# Patient Record
Sex: FEMALE | Race: BLACK OR AFRICAN AMERICAN | Employment: UNEMPLOYED | URBAN - METROPOLITAN AREA
[De-identification: names, ages, dates, MRNs, and addresses within clinical notes are randomized per-mention and may not be internally consistent; named-entity substitution may affect disease eponyms.]

---

## 2020-03-09 ENCOUNTER — HOSPITAL ENCOUNTER (OUTPATIENT)
Age: 25
Discharge: HOME | End: 2020-03-09
Attending: OBSTETRICS & GYNECOLOGY | Admitting: OBSTETRICS & GYNECOLOGY
Payer: SELF-PAY

## 2020-03-09 VITALS
OXYGEN SATURATION: 97 % | RESPIRATION RATE: 16 BRPM | BODY MASS INDEX: 29.02 KG/M2 | SYSTOLIC BLOOD PRESSURE: 118 MMHG | HEIGHT: 64 IN | DIASTOLIC BLOOD PRESSURE: 63 MMHG | TEMPERATURE: 97.9 F | HEART RATE: 90 BPM | WEIGHT: 170 LBS

## 2020-03-09 PROBLEM — O46.93 VAGINAL BLEEDING IN PREGNANCY, THIRD TRIMESTER: Status: ACTIVE | Noted: 2020-03-09

## 2020-03-09 PROCEDURE — 75810000275 HC EMERGENCY DEPT VISIT NO LEVEL OF CARE

## 2020-03-09 PROCEDURE — 99282 EMERGENCY DEPT VISIT SF MDM: CPT

## 2020-03-09 NOTE — DISCHARGE INSTRUCTIONS
Patient Education        Vaginal Bleeding During Pregnancy: Care Instructions  Your Care Instructions    Many women have some vaginal bleeding when they are pregnant. In some cases, the bleeding is not serious. And there aren't any more problems with the pregnancy. But sometimes bleeding is a sign of a more serious problem. This is more common if the bleeding is heavy or painful. Examples of more serious problems include miscarriage, an ectopic pregnancy, or a problem with the placenta. You may have to see your doctor again to be sure everything is okay. You may also need more tests to find the cause of the bleeding. For some women, home treatment is all they need. But it depends on what is causing the bleeding. Be sure to tell your doctor if you have any new symptoms or if your symptoms get worse. The doctor has checked you carefully, but problems can develop later. If you notice any problems or new symptoms, get medical treatment right away. Follow-up care is a key part of your treatment and safety. Be sure to make and go to all appointments, and call your doctor if you are having problems. It's also a good idea to know your test results and keep a list of the medicines you take. How can you care for yourself at home? · If your doctor prescribed medicines, take them exactly as directed. Call your doctor if you think you are having a problem with your medicine. · Do not have sex until the bleeding stops and your doctor says it's okay. · Ask your doctor about other activities you can or can't do. · Get a lot of rest. Being pregnant can make you tired. · Eat a healthy diet. Include a lot of peas, beans, and leafy green vegetables. Talk to a dietitian if you need help planning your diet. · Do not use nonsteroidal anti-inflammatory drugs (NSAIDs), such as ibuprofen (Advil, Motrin), naproxen (Aleve), or aspirin, unless your doctor says it is okay. When should you call for help?   Call 911 anytime you think you may need emergency care. For example, call if:    · You passed out (lost consciousness).     · You have severe vaginal bleeding.    Call your doctor now or seek immediate medical care if:    · You have any vaginal bleeding.     · You have pain in your belly or pelvis.     · You think that you are in labor.     · You have a sudden release of fluid from your vagina.     · You notice that your baby has stopped moving or is moving much less than normal.    Watch closely for changes in your health, and be sure to contact your doctor if you have any questions or concerns. Where can you learn more? Go to http://mari-jon.info/. Enter C303 in the search box to learn more about \"Vaginal Bleeding During Pregnancy: Care Instructions. \"  Current as of: May 29, 2019  Content Version: 12.2  © 7312-8987 [employer]. Care instructions adapted under license by [employer] (which disclaims liability or warranty for this information). If you have questions about a medical condition or this instruction, always ask your healthcare professional. [place] [address] any warranty or liability for your use of this information. Patient Education        Learning About When to Call Your Doctor During Pregnancy (After 20 Weeks)  Your Care Instructions  It's common to have concerns about what might be a problem during pregnancy. Although most pregnant women don't have any serious problems, it's important to know when to call your doctor if you have certain symptoms or signs of labor. These are general suggestions. Your doctor may give you some more information about when to call. When to call your doctor (after 20 weeks)  Call 911 anytime you think you may need emergency care. For example, call if:  · You have severe vaginal bleeding. · You have sudden, severe pain in your belly. · You passed out (lost consciousness). · You have a seizure.   · You see or feel the umbilical cord. · You think you are about to deliver your baby and can't make it safely to the hospital.  Call your doctor now or seek immediate medical care if:  · You have vaginal bleeding. · You have belly pain. · You have a fever. · You have symptoms of preeclampsia, such as:  ? Sudden swelling of your face, hands, or feet. ? New vision problems (such as dimness, blurring, or seeing spots). ? A severe headache. · You have a sudden release of fluid from your vagina. (You think your water broke.)  · You think that you may be in labor. This means that you've had at least 6 contractions in an hour. · You notice that your baby has stopped moving or is moving much less than normal.  · You have symptoms of a urinary tract infection. These may include:  ? Pain or burning when you urinate. ? A frequent need to urinate without being able to pass much urine. ? Pain in the flank, which is just below the rib cage and above the waist on either side of the back. ? Blood in your urine. Watch closely for changes in your health, and be sure to contact your doctor if:  · You have vaginal discharge that smells bad. · You have skin changes, such as:  ? A rash. ? Itching. ? Yellow color to your skin. · You have other concerns about your pregnancy. If you have labor signs at 37 weeks or more  If you have signs of labor at 37 weeks or more, your doctor may tell you to call when your labor becomes more active. Symptoms of active labor include:  · Contractions that are regular. · Contractions that are less than 5 minutes apart. · Contractions that are hard to talk through. Follow-up care is a key part of your treatment and safety. Be sure to make and go to all appointments, and call your doctor if you are having problems. It's also a good idea to know your test results and keep a list of the medicines you take. Where can you learn more? Go to http://mari-jon.info/.   Enter  in the search box to learn more about \"Learning About When to Call Your Doctor During Pregnancy (After 20 Weeks). \"  Current as of: May 29, 2019  Content Version: 12.2  © 1665-9612 [facility]. Care instructions adapted under license by [employer] (which disclaims liability or warranty for this information). If you have questions about a medical condition or this instruction, always ask your healthcare professional. [place] [address] any warranty or liability for your use of this information.

## 2020-03-09 NOTE — PROGRESS NOTES
Pt presents with complaints of bleeding. Pt is here from [facility]. She has had prenatal care in [facility] and we will obtain records. Pt states she came here on  for a . She is planning to sty here in [place]

## 2020-03-09 NOTE — H&P
Chief Complaint: vaginal spotting      22 y.o. female  at 38w1d  weeks gestation who is seen for an episode of mucus discharge tinged with blood that occurred earlier today. Pt notes good FM. SHe denies VB, LOF, uterine ctx, abdominal pain, UTI or PEC symptoms. Pt has recently moved here from [place], [place]. She did receive prenatal care in [place]. HISTORY:    Social History     Substance and Sexual Activity   Sexual Activity Not on file     No LMP recorded. Patient is pregnant. Social History     Socioeconomic History    Marital status: SINGLE     Spouse name: Not on file    Number of children: Not on file    Years of education: Not on file    Highest education level: Not on file   Occupational History    Not on file   Social Needs    Financial resource strain: Not on file    Food insecurity:     Worry: Not on file     Inability: Not on file    Transportation needs:     Medical: Not on file     Non-medical: Not on file   Tobacco Use    Smoking status: Never Smoker    Smokeless tobacco: Never Used   Substance and Sexual Activity    Alcohol use: Never     Frequency: Never    Drug use: Never    Sexual activity: Not on file   Lifestyle    Physical activity:     Days per week: Not on file     Minutes per session: Not on file    Stress: Not on file   Relationships    Social connections:     Talks on phone: Not on file     Gets together: Not on file     Attends Jain service: Not on file     Active member of club or organization: Not on file     Attends meetings of clubs or organizations: Not on file     Relationship status: Not on file    Intimate partner violence:     Fear of current or ex partner: Not on file     Emotionally abused: Not on file     Physically abused: Not on file     Forced sexual activity: Not on file   Other Topics Concern    Not on file   Social History Narrative    Not on file       No past surgical history on file.     Past Medical History:   Diagnosis Date    Asthma          ROS:  An 8  point review of symptoms negative except for chief complaint as described above. PHYSICAL EXAM:  Blood pressure 118/63, pulse 90, temperature 97.9 °F (36.6 °C), resp. rate 16, height 5' 4\" (1.626 m), weight 77.1 kg (170 lb), SpO2 97 %. Constitutional: The patient appears well, alert, oriented x 3. Cardiovascular: Heart RRR, no murmurs. Respiratory: Lungs clear, no respiratory distress  GI: Abdomen soft, nontender, no guarding  No fundal tenderness  Musculoskeletal: no cva tenderness  Lower ext: no edema, neg isabel's, reflexes +2  Skin: no rashes or lesions  Psychiatric:Mood/ Affect: appropriate  Genitourinary: SVE: long and closed; no blood in the vagina  FHT: Category 1 with mod variability and + accels  TOCO: no ctx  Abd ultrasound: vtx; active fetus; AFV is normal; placenta is anterior without evidence of abruption    I personally reviewed pt's medical record including relevant labs and ultrasounds  I reviewed the NST at today's encounter    Assessment/Plan:  Pt presents with an episode of blood tinged mucus earlier today. She is without complaints at this time. There is no evidence of VB and fetal well being is demonstrated. Pt discharged to home with labor precautions. Pt given appointment to f/u with Dr. Zacarias Babin within one week.

## 2020-03-09 NOTE — ED TRIAGE NOTES
Pt in states recently moved from [place]. States vaginal bleeding starting today. Pt is 38 weeks pregnant.

## 2020-03-11 PROBLEM — J45.909 ASTHMA: Status: ACTIVE | Noted: 2019-10-28

## 2020-03-11 PROBLEM — F41.9 ANXIETY: Status: ACTIVE | Noted: 2019-11-22

## 2020-03-11 PROBLEM — Z34.03 SUPERVISION OF NORMAL FIRST PREGNANCY IN THIRD TRIMESTER: Status: ACTIVE | Noted: 2020-03-11

## 2020-03-11 PROBLEM — D64.9 ANEMIA: Status: ACTIVE | Noted: 2019-12-09

## 2020-03-11 PROBLEM — O35.9XX0 PRIOR EXAM SUGGESTED FETAL ANOMALY, ANTEPARTUM: Status: ACTIVE | Noted: 2019-11-01

## 2020-03-22 ENCOUNTER — HOSPITAL ENCOUNTER (INPATIENT)
Age: 25
LOS: 4 days | Discharge: HOME | End: 2020-03-26
Attending: OBSTETRICS & GYNECOLOGY | Admitting: OBSTETRICS & GYNECOLOGY
Payer: MEDICAID

## 2020-03-22 PROBLEM — O42.90 AMNIOTIC FLUID LEAKING: Status: ACTIVE | Noted: 2020-03-22

## 2020-03-22 LAB
ABO + RH BLD: NORMAL
AMPHET UR QL SCN: NEGATIVE
BARBITURATES UR QL SCN: NEGATIVE
BENZODIAZ UR QL: NEGATIVE
BLOOD GROUP ANTIBODIES SERPL: NORMAL
CANNABINOIDS UR QL SCN: NEGATIVE
COCAINE UR QL SCN: NEGATIVE
ERYTHROCYTE [DISTWIDTH] IN BLOOD BY AUTOMATED COUNT: 21.1 % (ref 11.9–14.6)
HCT VFR BLD AUTO: 37.8 % (ref 35.8–46.3)
HGB BLD-MCNC: 11.7 G/DL (ref 11.7–15.4)
MCH RBC QN AUTO: 25.7 PG (ref 26.1–32.9)
MCHC RBC AUTO-ENTMCNC: 31 G/DL (ref 31.4–35)
MCV RBC AUTO: 83.1 FL (ref 79.6–97.8)
METHADONE UR QL: NEGATIVE
NRBC # BLD: 0 K/UL (ref 0–0.2)
OPIATES UR QL: NEGATIVE
PCP UR QL: NEGATIVE
PLATELET # BLD AUTO: 190 K/UL (ref 150–450)
PMV BLD AUTO: 12.5 FL (ref 9.4–12.3)
RBC # BLD AUTO: 4.55 M/UL (ref 4.05–5.2)
RPR SER QL: NONREACTIVE
RUBV IGG SERPL IA-ACNC: 7.6 IU/ML (ref 0–50)
SPECIMEN EXP DATE BLD: NORMAL
WBC # BLD AUTO: 9.6 K/UL (ref 4.3–11.1)

## 2020-03-22 PROCEDURE — 75410000002 HC LABOR FEE PER 1 HR: Performed by: OBSTETRICS & GYNECOLOGY

## 2020-03-22 PROCEDURE — 74011000250 HC RX REV CODE- 250: Performed by: OBSTETRICS & GYNECOLOGY

## 2020-03-22 PROCEDURE — 99283 EMERGENCY DEPT VISIT LOW MDM: CPT

## 2020-03-22 PROCEDURE — 86900 BLOOD TYPING SEROLOGIC ABO: CPT

## 2020-03-22 PROCEDURE — 74011250636 HC RX REV CODE- 250/636: Performed by: OBSTETRICS & GYNECOLOGY

## 2020-03-22 PROCEDURE — 65270000029 HC RM PRIVATE

## 2020-03-22 PROCEDURE — 59025 FETAL NON-STRESS TEST: CPT

## 2020-03-22 PROCEDURE — 74011000258 HC RX REV CODE- 258: Performed by: OBSTETRICS & GYNECOLOGY

## 2020-03-22 PROCEDURE — 80307 DRUG TEST PRSMV CHEM ANLYZR: CPT

## 2020-03-22 PROCEDURE — 4A1HXCZ MONITORING OF PRODUCTS OF CONCEPTION, CARDIAC RATE, EXTERNAL APPROACH: ICD-10-PCS | Performed by: OBSTETRICS & GYNECOLOGY

## 2020-03-22 PROCEDURE — 87389 HIV-1 AG W/HIV-1&-2 AB AG IA: CPT

## 2020-03-22 PROCEDURE — 86592 SYPHILIS TEST NON-TREP QUAL: CPT

## 2020-03-22 PROCEDURE — 87340 HEPATITIS B SURFACE AG IA: CPT

## 2020-03-22 PROCEDURE — 86762 RUBELLA ANTIBODY: CPT

## 2020-03-22 PROCEDURE — 87521 HEPATITIS C PROBE&RVRS TRNSC: CPT

## 2020-03-22 PROCEDURE — 85027 COMPLETE CBC AUTOMATED: CPT

## 2020-03-22 RX ORDER — ONDANSETRON 2 MG/ML
4 INJECTION INTRAMUSCULAR; INTRAVENOUS
Status: DISCONTINUED | OUTPATIENT
Start: 2020-03-22 | End: 2020-03-22 | Stop reason: SDUPTHER

## 2020-03-22 RX ORDER — MORPHINE SULFATE 2 MG/ML
4 INJECTION, SOLUTION INTRAMUSCULAR; INTRAVENOUS
Status: DISCONTINUED | OUTPATIENT
Start: 2020-03-22 | End: 2020-03-24

## 2020-03-22 RX ORDER — OXYTOCIN/RINGER'S LACTATE 15/250 ML
250 PLASTIC BAG, INJECTION (ML) INTRAVENOUS ONCE
Status: ACTIVE | OUTPATIENT
Start: 2020-03-22 | End: 2020-03-23

## 2020-03-22 RX ORDER — LIDOCAINE HYDROCHLORIDE 10 MG/ML
1 INJECTION INFILTRATION; PERINEURAL
Status: ACTIVE | OUTPATIENT
Start: 2020-03-22 | End: 2020-03-23

## 2020-03-22 RX ORDER — MINERAL OIL
120 OIL (ML) ORAL
Status: DISPENSED | OUTPATIENT
Start: 2020-03-22 | End: 2020-03-23

## 2020-03-22 RX ORDER — LIDOCAINE HYDROCHLORIDE 20 MG/ML
JELLY TOPICAL
Status: ACTIVE | OUTPATIENT
Start: 2020-03-22 | End: 2020-03-23

## 2020-03-22 RX ORDER — BUTORPHANOL TARTRATE 2 MG/ML
1 INJECTION INTRAMUSCULAR; INTRAVENOUS
Status: DISCONTINUED | OUTPATIENT
Start: 2020-03-22 | End: 2020-03-24 | Stop reason: HOSPADM

## 2020-03-22 RX ORDER — ZOLPIDEM TARTRATE 5 MG/1
5 TABLET ORAL
Status: DISCONTINUED | OUTPATIENT
Start: 2020-03-22 | End: 2020-03-26 | Stop reason: HOSPADM

## 2020-03-22 RX ORDER — SODIUM CHLORIDE 0.9 % (FLUSH) 0.9 %
5-40 SYRINGE (ML) INJECTION AS NEEDED
Status: DISCONTINUED | OUTPATIENT
Start: 2020-03-22 | End: 2020-03-26 | Stop reason: HOSPADM

## 2020-03-22 RX ORDER — SODIUM CHLORIDE 0.9 % (FLUSH) 0.9 %
5-40 SYRINGE (ML) INJECTION EVERY 8 HOURS
Status: DISCONTINUED | OUTPATIENT
Start: 2020-03-22 | End: 2020-03-25

## 2020-03-22 RX ORDER — ACETAMINOPHEN 500 MG
1000 TABLET ORAL
Status: DISCONTINUED | OUTPATIENT
Start: 2020-03-22 | End: 2020-03-26 | Stop reason: HOSPADM

## 2020-03-22 RX ORDER — DEXTROSE, SODIUM CHLORIDE, SODIUM LACTATE, POTASSIUM CHLORIDE, AND CALCIUM CHLORIDE 5; .6; .31; .03; .02 G/100ML; G/100ML; G/100ML; G/100ML; G/100ML
125 INJECTION, SOLUTION INTRAVENOUS CONTINUOUS
Status: DISCONTINUED | OUTPATIENT
Start: 2020-03-22 | End: 2020-03-24

## 2020-03-22 RX ORDER — ONDANSETRON 2 MG/ML
4 INJECTION INTRAMUSCULAR; INTRAVENOUS
Status: DISCONTINUED | OUTPATIENT
Start: 2020-03-22 | End: 2020-03-26 | Stop reason: HOSPADM

## 2020-03-22 RX ORDER — OXYTOCIN/RINGER'S LACTATE 30/500 ML
1-25 PLASTIC BAG, INJECTION (ML) INTRAVENOUS
Status: DISCONTINUED | OUTPATIENT
Start: 2020-03-22 | End: 2020-03-24

## 2020-03-22 RX ADMIN — SODIUM CHLORIDE 5 MILLION UNITS: 900 INJECTION, SOLUTION INTRAVENOUS at 19:40

## 2020-03-22 RX ADMIN — Medication 2 MILLI-UNITS/MIN: at 19:40

## 2020-03-22 RX ADMIN — PENICILLIN G POTASSIUM 2.5 MILLION UNITS: 20000000 POWDER, FOR SOLUTION INTRAVENOUS at 23:42

## 2020-03-22 RX ADMIN — SODIUM CHLORIDE, SODIUM LACTATE, POTASSIUM CHLORIDE, CALCIUM CHLORIDE, AND DEXTROSE MONOHYDRATE 125 ML/HR: 600; 310; 30; 20; 5 INJECTION, SOLUTION INTRAVENOUS at 19:39

## 2020-03-22 RX ADMIN — FAMOTIDINE 20 MG: 10 INJECTION INTRAVENOUS at 21:44

## 2020-03-22 RX ADMIN — SODIUM CHLORIDE, SODIUM LACTATE, POTASSIUM CHLORIDE, AND CALCIUM CHLORIDE 500 ML: 600; 310; 30; 20 INJECTION, SOLUTION INTRAVENOUS at 19:39

## 2020-03-22 NOTE — PROGRESS NOTES
No records from [facility] in chart and pt doesn't have them. States GBS was Negative. Dr Vahe Arita ordered PCN. Prenatal panel ordered. SBAR report to Sarkis Lopez RN.

## 2020-03-22 NOTE — H&P
History & Physical    Name: Cali Suarez MRN: [id number]  SSN: [id number]    YOB: 1995  Age: 22 y.o. Sex: female    CC: SROM    Subjective: Pt is 21 y/o  at 37w0d who presents with SROM at 1400 today. Pt also notes irregular ctx. Pt notes good FM. She denies VB, UTI or PEC symptoms. Pt's prenatal course c/b SHARIF, asthma (controlled on inhalers), and late transfer of prenatal care. Estimated Date of Delivery: 3/22/20  OB History    Para Term  AB Living   2       1     SAB TAB Ectopic Molar Multiple Live Births   1                # Outcome Date GA Lbr Joaquim/2nd Weight Sex Delivery Anes PTL Lv   2 Current            2015                Please see prenatal records for details. Past Medical History:   Diagnosis Date    Anemia     Asthma     Psychiatric problem     anxiety     Past Surgical History:   Procedure Laterality Date    HX OTHER SURGICAL      D&C miscarriage     Social History     Occupational History    Not on file   Tobacco Use    Smoking status: Never Smoker    Smokeless tobacco: Never Used   Substance and Sexual Activity    Alcohol use: Never     Frequency: Never    Drug use: Never    Sexual activity: Not on file     Family History   Problem Relation Age of Onset    Asthma Mother     MS Father     Asthma Sister        No Known Allergies  Prior to Admission medications    Medication Sig Start Date End Date Taking? Authorizing Provider   ferrous sulfate 324 mg (65 mg iron) tablet Take 324 mg by mouth. 19  Yes Provider, Historical   prenatal vit-calcium-iron-fa (PRENATAL PLUS with CALCIUM) 27 mg iron- 1 mg tab Take 1 Tab by mouth daily. 10/28/19  Yes Provider, Historical   albuterol (PROVENTIL HFA, VENTOLIN HFA, PROAIR HFA) 90 mcg/actuation inhaler Take 2 Puffs by inhalation.  1/3/16   Provider, Historical        Review of Systems:  Constitutional:No headache, fever  Cardiac:   No chest pain      Resp: No cough or shortness of breath GI:   No nausea/vomiting, diarrhea   :   No dysuria  Neuro:     No vision changes, headache      Objective:     Vitals: There were no vitals filed for this visit. Physical Exam:  Patient without distress. Heart: Regular rate and rhythm  Lung: clear to auscultation throughout lung fields, no wheezes, no rales, no rhonchi and normal respiratory effort  Back: costovertebral angle tenderness absent  Abdomen: soft, nontender  Fundus: soft and non tender  Cervical Exam: 4 cm dilated    70% effaced    -2 station    Presenting Part: cephalic  Lower Extremities:  - Edema No  Membranes:  Spontaneous Rupture of Membranes; Amniotic Fluid: clear fluid  Fetal Heart Rate: Baseline: 145 per minute  Variability: moderate  Accelerations: no  Decelerations: none  Uterine contractions: irregular    Prenatal Labs:   No results found for: RUBELLAEXT, GRBSEXT, HBSAGEXT, HIVEXT, RPREXT, GONNOEXT, CHLAMEXT      Assessment/Plan:     Ms. Shavon Palm is a  seen with pregnancy at 40w0d for Presumptive ROM . No results found for: GRBSEXT    Plan:     Admit for labor management. Start IV PCN per the GBS protocol in light of pt's unknown GBS status. Start pitocin augmentation. Patient discussed with Dr. Ilda Shah.

## 2020-03-22 NOTE — PROGRESS NOTES
Here with C/o SROM at 1600. Denies pain. SVE now per Fransisca 4/70/-2 and posterior. Grossly ruptured.

## 2020-03-23 ENCOUNTER — ANESTHESIA (OUTPATIENT)
Dept: LABOR AND DELIVERY | Age: 25
End: 2020-03-23
Payer: MEDICAID

## 2020-03-23 ENCOUNTER — ANESTHESIA EVENT (OUTPATIENT)
Dept: LABOR AND DELIVERY | Age: 25
End: 2020-03-23
Payer: MEDICAID

## 2020-03-23 LAB
ARTERIAL PATENCY WRIST A: ABNORMAL
ARTERIAL PATENCY WRIST A: ABNORMAL
BASE DEFICIT BLD-SCNC: 6 MMOL/L
BASE DEFICIT BLD-SCNC: 7 MMOL/L
BDY SITE: ABNORMAL
BDY SITE: ABNORMAL
CO2 BLD-SCNC: 20 MMOL/L
CO2 BLD-SCNC: 25 MMOL/L
COLLECT TIME,HTIME: 2214
COLLECT TIME,HTIME: 2214
GAS FLOW.O2 O2 DELIVERY SYS: ABNORMAL L/MIN
GAS FLOW.O2 O2 DELIVERY SYS: ABNORMAL L/MIN
HCO3 BLD-SCNC: 22.9 MMOL/L (ref 22–26)
HCO3 BLDV-SCNC: 18.5 MMOL/L (ref 23–28)
O2/TOTAL GAS SETTING VFR VENT: 21 %
O2/TOTAL GAS SETTING VFR VENT: 21 %
PCO2 BLDCO: 35 MMHG (ref 32–68)
PCO2 BLDCO: 59 MMHG (ref 32–68)
PH BLDCO: 7.2 [PH] (ref 7.15–7.38)
PH BLDCO: 7.33 [PH] (ref 7.15–7.38)
PO2 BLDCO: 16 MMHG
PO2 BLDCO: 30 MMHG
SAO2 % BLD: 15 % (ref 95–98)
SAO2 % BLDV: 54 % (ref 65–88)
SERVICE CMNT-IMP: ABNORMAL
SERVICE CMNT-IMP: ABNORMAL
SPECIMEN TYPE: ABNORMAL
SPECIMEN TYPE: ABNORMAL

## 2020-03-23 PROCEDURE — 82803 BLOOD GASES ANY COMBINATION: CPT

## 2020-03-23 PROCEDURE — 65270000029 HC RM PRIVATE

## 2020-03-23 PROCEDURE — 77030018846 HC SOL IRR STRL H20 ICUM -A

## 2020-03-23 PROCEDURE — A4300 CATH IMPL VASC ACCESS PORTAL: HCPCS | Performed by: ANESTHESIOLOGY

## 2020-03-23 PROCEDURE — 74011250636 HC RX REV CODE- 250/636: Performed by: OBSTETRICS & GYNECOLOGY

## 2020-03-23 PROCEDURE — 75410000001 HC DELIVERY VAGINAL/MULTIPLE

## 2020-03-23 PROCEDURE — 77030011945 HC CATH URIN INT ST MENT -A

## 2020-03-23 PROCEDURE — 74011250636 HC RX REV CODE- 250/636: Performed by: REGISTERED NURSE

## 2020-03-23 PROCEDURE — 77030014125 HC TY EPDRL BBMI -B: Performed by: ANESTHESIOLOGY

## 2020-03-23 PROCEDURE — 0HQ9XZZ REPAIR PERINEUM SKIN, EXTERNAL APPROACH: ICD-10-PCS | Performed by: OBSTETRICS & GYNECOLOGY

## 2020-03-23 PROCEDURE — 75410000003 HC RECOV DEL/VAG/CSECN EA 0.5 HR

## 2020-03-23 PROCEDURE — 75410000000 HC DELIVERY VAGINAL/SINGLE

## 2020-03-23 PROCEDURE — 77030009413 HC ELECTRD SCALP COVD -A

## 2020-03-23 PROCEDURE — 75410000002 HC LABOR FEE PER 1 HR

## 2020-03-23 PROCEDURE — 76060000078 HC EPIDURAL ANESTHESIA

## 2020-03-23 PROCEDURE — 77030003028 HC SUT VCRL J&J -A

## 2020-03-23 RX ORDER — FENTANYL CITRATE 50 UG/ML
INJECTION, SOLUTION INTRAMUSCULAR; INTRAVENOUS AS NEEDED
Status: DISCONTINUED | OUTPATIENT
Start: 2020-03-23 | End: 2020-03-23 | Stop reason: HOSPADM

## 2020-03-23 RX ORDER — FENTANYL CITRATE 50 UG/ML
INJECTION, SOLUTION INTRAMUSCULAR; INTRAVENOUS
Status: COMPLETED
Start: 2020-03-23 | End: 2020-03-23

## 2020-03-23 RX ORDER — ROPIVACAINE HYDROCHLORIDE 2 MG/ML
INJECTION, SOLUTION EPIDURAL; INFILTRATION; PERINEURAL AS NEEDED
Status: DISCONTINUED | OUTPATIENT
Start: 2020-03-23 | End: 2020-03-23 | Stop reason: HOSPADM

## 2020-03-23 RX ORDER — ROPIVACAINE HYDROCHLORIDE 2 MG/ML
INJECTION, SOLUTION EPIDURAL; INFILTRATION; PERINEURAL
Status: DISCONTINUED | OUTPATIENT
Start: 2020-03-23 | End: 2020-03-23 | Stop reason: HOSPADM

## 2020-03-23 RX ADMIN — PENICILLIN G POTASSIUM 2.5 MILLION UNITS: 20000000 POWDER, FOR SOLUTION INTRAVENOUS at 07:49

## 2020-03-23 RX ADMIN — FENTANYL CITRATE 100 MCG: 50 INJECTION INTRAMUSCULAR; INTRAVENOUS at 08:23

## 2020-03-23 RX ADMIN — PENICILLIN G POTASSIUM 2.5 MILLION UNITS: 20000000 POWDER, FOR SOLUTION INTRAVENOUS at 03:45

## 2020-03-23 RX ADMIN — PENICILLIN G POTASSIUM 2.5 MILLION UNITS: 20000000 POWDER, FOR SOLUTION INTRAVENOUS at 19:46

## 2020-03-23 RX ADMIN — SODIUM CHLORIDE, SODIUM LACTATE, POTASSIUM CHLORIDE, CALCIUM CHLORIDE, AND DEXTROSE MONOHYDRATE 125 ML/HR: 600; 310; 30; 20; 5 INJECTION, SOLUTION INTRAVENOUS at 02:30

## 2020-03-23 RX ADMIN — ONDANSETRON 4 MG: 2 INJECTION INTRAMUSCULAR; INTRAVENOUS at 07:54

## 2020-03-23 RX ADMIN — Medication 8 ML: at 08:23

## 2020-03-23 RX ADMIN — ROPIVACAINE HYDROCHLORIDE 10 ML/HR: 2 INJECTION, SOLUTION EPIDURAL; INFILTRATION at 08:23

## 2020-03-23 RX ADMIN — PENICILLIN G POTASSIUM 2.5 MILLION UNITS: 20000000 POWDER, FOR SOLUTION INTRAVENOUS at 15:50

## 2020-03-23 RX ADMIN — Medication 2 MILLI-UNITS/MIN: at 06:14

## 2020-03-23 RX ADMIN — SODIUM CHLORIDE, SODIUM LACTATE, POTASSIUM CHLORIDE, CALCIUM CHLORIDE, AND DEXTROSE MONOHYDRATE 125 ML/HR: 600; 310; 30; 20; 5 INJECTION, SOLUTION INTRAVENOUS at 11:57

## 2020-03-23 RX ADMIN — PENICILLIN G POTASSIUM 2.5 MILLION UNITS: 20000000 POWDER, FOR SOLUTION INTRAVENOUS at 11:57

## 2020-03-23 NOTE — PROGRESS NOTES
Deceleration of FHR into 70's with slow return to baseline. Pt repositioned left to right sides; SVE 6-7/90/-1  1750: FHR baseline 105-110 with questionable marked variability  1800:  Pitocin decreased to 12 mu  1810:  SVE Matthew Escalante RN, c/c/-1.   FSE placed

## 2020-03-23 NOTE — PROGRESS NOTES
Shift assessment completed per flow sheet. Pt denies complaints of  HA, epigastric pain, blurred vision or N/V. See flowsheet for details. POC reviewed with pt and pt verbalized understanding. Pt oriented to room and has call light within reach. Pt denies any needs at this time but will call out PRN. Pt now resting in bed.

## 2020-03-23 NOTE — PROGRESS NOTES
[id number] Pt up to bathroom. Denies needs and concerns at this time. Encouraged to call out PRN. Pt voiced understanding.

## 2020-03-23 NOTE — PROGRESS NOTES
Subjective: Pt tolerating labor well. On pit at 6 mu/min.   S/p ARTEMOI with good result    Objective:    Vitals:    20 1345 20 1430 20 1446 20 1500   BP: 127/75 120/73 135/80 96/57   Pulse: 70 64 63 70   Resp: 16      Temp: 98 °F (36.7 °C)          FHT's:  Baseline , reactive  Petal:  q 4-6 min    SVE:  3/100/-3  Membranes:  AROM/CLR    Assessement and Plan: Constancia Draper 22 y.o.  at 40w1d admitted for progressive labor    Continue pitocin augmentation     Pain control: good    GBS: unknown      Shara Richards DO    3:07 PM    20

## 2020-03-23 NOTE — PROGRESS NOTES
RN @ bedside. Pt changed positions for better TOCO tracing. Pt tolerated well. Pt denies needs at this time. Encouraged to call out PRN. Pt voiced understanding.

## 2020-03-23 NOTE — PROGRESS NOTES
Received care of pt from [facility]. Care assumed. Pt desires epidural.  IVB started. Plan of care reviewed with pt. Verbalizes understanding.

## 2020-03-23 NOTE — ANESTHESIA PREPROCEDURE EVALUATION
Relevant Problems   No relevant active problems       Anesthetic History   No history of anesthetic complications            Review of Systems / Medical History  Patient summary reviewed and pertinent labs reviewed    Pulmonary            Asthma        Neuro/Psych   Within defined limits           Cardiovascular                  Exercise tolerance: >4 METS     GI/Hepatic/Renal  Within defined limits              Endo/Other        Obesity     Other Findings              Physical Exam    Airway  Mallampati: I  TM Distance: 4 - 6 cm  Neck ROM: normal range of motion   Mouth opening: Normal     Cardiovascular    Rhythm: regular  Rate: abnormal         Dental         Pulmonary  Breath sounds clear to auscultation               Abdominal         Other Findings            Anesthetic Plan    ASA: 2  Anesthesia type: epidural          Induction: Intravenous  Anesthetic plan and risks discussed with: Patient

## 2020-03-23 NOTE — PROGRESS NOTES
SW consult received - SW will meet with patient after delivery.     [facility] , [facility]YEL[facility]  [address] [place] [address]   [phone]
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
## 2020-03-23 NOTE — PROGRESS NOTES
Dr. Cony Carter at bedside  Strip reviewed  No SVE at this time  Will check pt around 1545  If no changes, will increase pitocin titration   Pt repositioned to right side on peanut

## 2020-03-23 NOTE — PROGRESS NOTES
Subjective: Pt tolerating labor well. Slow progress  With pit at 18 mu/min. ctxs q 2    Objective:    Vitals:    20 1345 20 1430 20 1446 20 1500   BP: 127/75 120/73 135/80 96/57   Pulse: 70 64 63 70   Resp: 16      Temp: 98 °F (36.7 °C)          FHT's:  Baseline , qaceel  Avis:  q2    SVE:  4/100/-2  Membranes:  AROM    Assessement and Plan: Duane Brennan 24 y.o.  at 40w1d admitted for spont labor    Continue pitocin augmentation .   Requested IUPC but no devices available    Pain control: ARTEMIO    GBS: unknown/ treated      Rola Michel DO    3:10 PM    20

## 2020-03-23 NOTE — PROGRESS NOTES
Dr. Lele Anthony at bedside   AROM of fore bag  3 cm  Dr. Lele Anthony notified that we don't have prenatal labs on file; MD will fax records that he has

## 2020-03-23 NOTE — ANESTHESIA PROCEDURE NOTES
Epidural Block    Start time: 3/23/2020 8:10 AM  End time: 3/23/2020 8:15 AM  Performed by: Abebe Pennington MD  Authorized by: Abebe Pennington MD     Pre-Procedure  Indication: at surgeon's request, procedure for pain and labor epidural    Preanesthetic Checklist: patient identified, risks and benefits discussed, anesthesia consent, site marked, patient being monitored, timeout performed and anesthesia consent      Epidural:   Patient position:  Seated  Prep region:  Lumbar  Prep: Chlorhexidine    Location:  L2-3    Needle and Epidural Catheter:   Needle Type:  Tuohy  Needle Gauge:  19 G  Injection Technique:  Loss of resistance using air and loss of resistance using saline  Attempts:  1  Catheter Size:  20 G  Catheter at Skin Depth (cm):  10  Depth in Epidural Space (cm):  5  Events: no blood with aspiration, no cerebrospinal fluid with aspiration, no paresthesia and negative aspiration test    Test Dose:  Lidocaine 1.5% w/ epi and negative    Assessment:   Catheter Secured:  Tegaderm and tape  Insertion:  Uncomplicated  Patient tolerance:  Patient tolerated the procedure well with no immediate complications

## 2020-03-23 NOTE — PROGRESS NOTES
Dr. Carlos Alberto Saab updated on pt status. Telephone orders to stop and hold pitocin. If patient has not made progress, start pitocin @ [phone].

## 2020-03-24 PROBLEM — Z37.9 NORMAL LABOR: Status: ACTIVE | Noted: 2020-03-24

## 2020-03-24 LAB
HBV SURFACE AG SERPL QL IA: NEGATIVE
HIV 1+2 AB+HIV1 P24 AG SERPL QL IA: NON REACTIVE

## 2020-03-24 PROCEDURE — 74011250637 HC RX REV CODE- 250/637: Performed by: OBSTETRICS & GYNECOLOGY

## 2020-03-24 PROCEDURE — 65270000029 HC RM PRIVATE

## 2020-03-24 RX ORDER — MORPHINE SULFATE 10 MG/ML
5 INJECTION, SOLUTION INTRAMUSCULAR; INTRAVENOUS
Status: DISCONTINUED | OUTPATIENT
Start: 2020-03-24 | End: 2020-03-26 | Stop reason: HOSPADM

## 2020-03-24 RX ORDER — DOCUSATE SODIUM 100 MG/1
100 CAPSULE, LIQUID FILLED ORAL 2 TIMES DAILY
Status: DISCONTINUED | OUTPATIENT
Start: 2020-03-24 | End: 2020-03-26 | Stop reason: HOSPADM

## 2020-03-24 RX ORDER — IBUPROFEN 400 MG/1
400 TABLET ORAL
Status: DISCONTINUED | OUTPATIENT
Start: 2020-03-24 | End: 2020-03-26 | Stop reason: HOSPADM

## 2020-03-24 RX ORDER — LANOLIN ALCOHOL/MO/W.PET/CERES
1 CREAM (GRAM) TOPICAL
Status: DISCONTINUED | OUTPATIENT
Start: 2020-03-24 | End: 2020-03-26 | Stop reason: HOSPADM

## 2020-03-24 RX ORDER — DIPHENHYDRAMINE HCL 25 MG
25 CAPSULE ORAL
Status: DISCONTINUED | OUTPATIENT
Start: 2020-03-24 | End: 2020-03-26 | Stop reason: HOSPADM

## 2020-03-24 RX ORDER — ONDANSETRON 4 MG/1
4 TABLET, ORALLY DISINTEGRATING ORAL
Status: ACTIVE | OUTPATIENT
Start: 2020-03-24 | End: 2020-03-25

## 2020-03-24 RX ORDER — HYDROCODONE BITARTRATE AND ACETAMINOPHEN 5; 325 MG/1; MG/1
1 TABLET ORAL
Status: DISCONTINUED | OUTPATIENT
Start: 2020-03-24 | End: 2020-03-26 | Stop reason: HOSPADM

## 2020-03-24 RX ORDER — NALOXONE HYDROCHLORIDE 0.4 MG/ML
0.4 INJECTION, SOLUTION INTRAMUSCULAR; INTRAVENOUS; SUBCUTANEOUS AS NEEDED
Status: DISCONTINUED | OUTPATIENT
Start: 2020-03-24 | End: 2020-03-26 | Stop reason: HOSPADM

## 2020-03-24 RX ADMIN — IBUPROFEN 400 MG: 400 TABLET, FILM COATED ORAL at 06:59

## 2020-03-24 RX ADMIN — DOCUSATE SODIUM 100 MG: 100 CAPSULE, LIQUID FILLED ORAL at 08:11

## 2020-03-24 RX ADMIN — FERROUS SULFATE TAB 325 MG (65 MG ELEMENTAL FE) 325 MG: 325 (65 FE) TAB at 08:11

## 2020-03-24 RX ADMIN — HYDROCODONE BITARTRATE AND ACETAMINOPHEN 1 TABLET: 5; 325 TABLET ORAL at 06:59

## 2020-03-24 NOTE — L&D DELIVERY NOTE
Called for delivery. After about about 30 minutes of pushing patient delivered directly op, with the body following after without difficulty. Baby taken to warmer. Placenta s/i/3vc. Cervix vagina intact. Left hymen and labial laceration repaired with3-0 rapide. See delivery summary for ebl. Delivery Summary    Patient: Ella Hunter MRN: [id number]  SSN: [id number]    YOB: 1995  Age: 22 y.o. Sex: female       Information for the patient's : Mertie Reading [[id number]]       Labor Events:    Labor: No    Steroids: None   Cervical Ripening Date/Time:       Cervical Ripening Type: None   Antibiotics During Labor:     Rupture Identifier: Sac 1    Rupture Date/Time: 3/22/2020     Rupture Type: SROM   Amniotic Fluid Volume:      Amniotic Fluid Description: Clear    Amniotic Fluid Odor:      Induction:         Induction Date/Time:        Indications for Induction:      Augmentation: Oxytocin   Augmentation Date/Time: 3/23/45783:14 AM   Indications for Augmentation: Ineffective Contraction Pattern   Labor complications:          Additional complications:        Delivery Events:  Indications For Episiotomy:     Episiotomy: None   Perineal Laceration(s): 1st   Repaired: Yes   Periurethral Laceration Location:      Repaired:     Labial Laceration Location:     Repaired:     Sulcal Laceration Location:     Repaired:     Vaginal Laceration Location:     Repaired:     Cervical Laceration Location:     Repaired:     Repair Suture: Rapide 3-0   Number of Repair Packets: 1   Estimated Blood Loss (ml):  ml     Delivery Date: 3/23/2020    Delivery Time: 10:14 PM  Delivery Type: Vaginal, Spontaneous  Sex:  Male    Gestational Age: 44w3d   Delivery Clinician:  Sierra Scales  Living Status: Living   Delivery Location: L&D 432          APGARS  One minute Five minutes Ten minutes   Skin color: 0   1        Heart rate: 2   2        Grimace: 1   2        Muscle tone: 2   2        Breathin 2        Totals: 7   9            Presentation: Vertex    Position: Middle Occiput Posterior  Resuscitation Method:  Tactile Stimulation;Suctioning-bulb     Meconium Stained: None      Cord Information: 3 Vessels  Complications: None  Cord around:    Delayed cord clamping? Yes  Cord clamped date/time:   Disposition of Cord Blood: Lab    Blood Gases Sent?: Yes    Placenta:  Date/Time: 3/23/2020 10:17 PM  Removal: Spontaneous      Appearance: Normal      Measurements:  Birth Weight: 3.48 kg      Birth Length: 54 cm      Head Circumference: 33 cm      Chest Circumference: 32 cm     Abdominal Girth: Other Providers:   Ayala RAMIREZ;EDER ARMENTA;ISENA MALDONADO;PHONG MARTIN;, Obstetrician;Primary Nurse;Charge Nurse;Scrub Tech           Group B Strep: No results found for: GRBSEXT, GRBSEXT  Information for the patient's : Mary Erickson [[id number]]     Lab Results   Component Value Date/Time    ABO/Rh(D) B POSITIVE 2020 10:14 PM    GIANFRANCO IgG NEG 2020 10:14 PM     Recent Labs     20  2242 20  2240   PCO2CB 35 59   PO2CB 30 16   HCO3I  --  22.9   SO2I  --  15*   IBD 7 6   SPECTI VENOUS CORD ARTERIAL CORD   PHICB 7.327 7. [facility] ROOM AIR ROOM AIR   IALLEN NOT APPLICABLE NOT APPLICABLE      Martha Lou MD

## 2020-03-24 NOTE — PROGRESS NOTES
SBAR OUT Report: Mother    Verbal report given to Kwame Dai RN (full name & credentials) on this patient, who is now being transferred to MIU (unit) for routine progression of care. The patient is not wearing a green \"Anesthesia-Duramorph\" band. Report consisted of patient's Situation, Background, Assessment and Recommendations (SBAR). [place] ID bands were compared with the identification form, and verified with the patient and receiving nurse. Information from the SBAR, Intake/Output and MAR and the [place] Report was reviewed with the receiving nurse; opportunity for questions and clarification provided. Fundal Check performed with oncoming RN.
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
## 2020-03-24 NOTE — PROGRESS NOTES
SBAR IN Report: Mother    Verbal report received from Adeel Marquez RN (full name & credentials) on this patient, who is now being transferred from L&D (unit) for routine progression of care. The patient is wearing a green \"Anesthesia-Duramorph\" band. Report consisted of patient's Situation, Background, Assessment and Recommendations (SBAR). [place] ID bands were compared with the identification form, and verified with the patient and transferring nurse. Information from the SBAR, Intake/Output, MAR and Recent Results and the Melisa Report was reviewed with the transferring nurse; opportunity for questions and clarification provided.

## 2020-03-24 NOTE — PROGRESS NOTES
Delivery Note    Dr Cay Meigs arrived to bedside at 2148. Pt positioned for delivery and set up at 2148. Spontaneous vaginal delivery of viable male infant at 2214. Apgar's 7/9. See delivery summary for details.

## 2020-03-24 NOTE — PROGRESS NOTES
Care of patient transferred to me by dr. Anahi Encarnacion. Medical record reviewed, patient seen and examined. Currently completely effaced and dilated and -1 station. Has had low labor progress. Will allow to labor down and then begin pushing. Martha Malcolm MD

## 2020-03-24 NOTE — LACTATION NOTE
This note was copied from a baby's chart. Assisted with attempt at breast earlier. Baby was trying, but not very interested. Mom states he ate for 15 minutes around 36. He was alert and seemed interested. Assisted with breastfeeding in football on R.  Baby fed fair. It takes him a little while to get on the breast.  He opens his mouth, but seems to struggle with closing to latch. After a few minutes he was able to get on and he nursed well about 5 minutes. He coughed and came off and milk was running down his chin. Noted mom discussed possibly supplementing earlier, but has not yet. Pump set up at bedside. If he is unable to latch or mom decides to supplement, suggest starting pumping tonight. Demonstrated manual lip flange. Encouraged frequent feeding and watch output.

## 2020-03-24 NOTE — PROGRESS NOTES
Patient is S/P vaginal delivery at 40 weeks. No complaints today. Lochia < menses. No GI/ issues. No F/C. Visit Vitals  /61 (BP 1 Location: Left arm, BP Patient Position: At rest;Head of bed elevated (Comment degrees))   Pulse 63   Temp 98.3 °F (36.8 °C)   Resp 18   Ht 5' 4\" (1.626 m)   Wt 180 lb (81.6 kg)   SpO2 97%   Breastfeeding Yes   BMI 30.90 kg/m²        CV - RRR  LUNGS - CTA bilaterally  ABD - soft, approp tend, FF below umbilicus  EXT - tr edema bilaterally          Labs:    Recent Results (from the past 24 hour(s))   BLOOD GAS, CORD BLOOD    Collection Time: 20 10:40 PM   Result Value Ref Range    Device: ROOM AIR      FIO2 (POC) 21 %    pH, cord blood (POC) 7.196 7.15 - 7.38      pCO2 cord blood 59 32 - 68 mmHg    pO2 cord blood 16 mmHg    HCO3 (POC) 22.9 22 - 26 MMOL/L    sO2 (POC) 15 (L) 95 - 98 %    Base deficit (POC) 6 mmol/L    Allens test (POC) NOT APPLICABLE      Site CORD      Specimen type (POC) ARTERIAL CORD      Performed by Rob(Delmy)RT     CO2, POC 25 MMOL/L    COLLECT TIME 2,214     BLOOD GAS, CORD BLOOD    Collection Time: 20 10:42 PM   Result Value Ref Range    Device: ROOM AIR      FIO2 (POC) 21 %    pH, cord blood (POC) 7.327 7. 15 - 7.38      pCO2 cord blood 35 32 - 68 mmHg    pO2 cord blood 30 mmHg    HCO3, venous (POC) 18.5 (L) 23 - 28 MMOL/L    sO2, venous (POC) 54 (L) 65 - 88 %    Base deficit (POC) 7 mmol/L    Allens test (POC) NOT APPLICABLE      Site CORD      Specimen type (POC) VENOUS CORD      Performed by MendozatEtamy(Delmy)RT     CO2, POC 20 MMOL/L    COLLECT TIME 2,214           PPD #1      Pt is breast feeding . Stable.   Routine PP 1      Royanne Bidding, DO  12:58 PM  20

## 2020-03-25 LAB
HCT VFR BLD AUTO: 33.2 % (ref 35.8–46.3)
HCV RNA SERPL QL NAA+PROBE: NEGATIVE
HGB BLD-MCNC: 10.2 G/DL (ref 11.7–15.4)

## 2020-03-25 PROCEDURE — 36415 COLL VENOUS BLD VENIPUNCTURE: CPT

## 2020-03-25 PROCEDURE — 74011250637 HC RX REV CODE- 250/637: Performed by: OBSTETRICS & GYNECOLOGY

## 2020-03-25 PROCEDURE — 85018 HEMOGLOBIN: CPT

## 2020-03-25 PROCEDURE — 65270000029 HC RM PRIVATE

## 2020-03-25 RX ORDER — IBUPROFEN 400 MG/1
800 TABLET ORAL
Qty: 30 TAB | Refills: 0 | Status: SHIPPED | OUTPATIENT
Start: 2020-03-25

## 2020-03-25 RX ADMIN — DOCUSATE SODIUM 100 MG: 100 CAPSULE, LIQUID FILLED ORAL at 14:09

## 2020-03-25 RX ADMIN — IBUPROFEN 400 MG: 400 TABLET, FILM COATED ORAL at 21:56

## 2020-03-25 RX ADMIN — HYDROCODONE BITARTRATE AND ACETAMINOPHEN 1 TABLET: 5; 325 TABLET ORAL at 01:21

## 2020-03-25 RX ADMIN — FERROUS SULFATE TAB 325 MG (65 MG ELEMENTAL FE) 325 MG: 325 (65 FE) TAB at 14:09

## 2020-03-25 RX ADMIN — HYDROCODONE BITARTRATE AND ACETAMINOPHEN 1 TABLET: 5; 325 TABLET ORAL at 21:56

## 2020-03-25 NOTE — LACTATION NOTE
This note was copied from a baby's chart. Individualized Feeding Plan for Breastfeeding   Lactation Services [phone]      As much as possible, hold your baby on your chest so babys bare skin is against your bare skin with a blanket covering babys back, especially 30 minutes before it is time for baby to eat. Watch for early feeding cues such as, licking lips, sucking motions, rooting, hands to mouth. Crying is a late feeding cue. Feed your baby at least 8 times in 24 hours, or more if your baby is showing feeding cues. If baby is sleepy put baby skin to skin and watch for hunger cues. To rouse baby: unwrap, undress, massage hands, feet, & back, change diaper, gently change babys position from lying to sitting. 15-20 minutes on the first breast of active breastfeeding is considered a good feeding. Good, active breastfeeding is when baby is alert, tugging the nipple, their ear may move, and you can hear swallows. Allow baby to finish the first side before changing sides. Sleeping at the breast or only brief, light sucks should not be considered a good, full breastfeed. At each feeding:  __x__1. Do Suck Practice on finger before each feeding until sucking pattern is smooth. Try using index finger. Nail down towards tongue. __x__2. Hand Express for a few minutes prior to latching to help start milk flow. __x__3. Baby needs to NURSE WELL x 15-20 minutes on at least first breast, burp and offer 2nd breast at every feeding. If no sustained latch only attempt at breast for 10 minutes. If baby does not latch on and feed well on at least one side, you should:   __x__4. Double pump for 15 minutes with breast massage and compression. Hand express for an additional 2-3 minutes per side. Pump after each feeding attempt or poor feeding, up to 8 times per day. If you are not putting baby to the breast you need to pump 8 times a day. Pump every 3 hours. __x__5.  Give baby all of the breast milk you obtain using a straight syringe or  curved syringe. If baby does NOT have enough wet and dirty diapers per day, is jaundiced/lethargic, or has significant weight loss AND you do NOT pump enough milk for each feeding (per volume listed below), formula supplementation may need to be used. Call lactation department /pediatrician if you have concerns. AVERAGE INTAKES OF COLOSTRUM BY HEALTHY  INFANTS:  Time  Day Intake (ml per feeding)  Based on 8 feedings per day. 1st 24 hrs  1 2-10 ml  24-48 hrs  2 5-15 ml  48-72 hrs  3 15-30 ml (0.5-1 oz)  72-96 hrs  4 30-45 ml (1-1.5oz)                          5-6      45-60 ml (1.5-2oz)                           7         75 ml (2.5oz)    By day 7, baby will need 75 ml or 2.5 oz at each feeding based on 8 feedings per day & babys weight. (1oz = 30ml). Total milk volume needed in 24 hours by Day 7 is 20.5 oz per day based on baby's birthweight of 7lb 11oz. The more often baby eats, the less volume they need per feeding. If baby is eating more often than the minimum of 8 times per day, they may take less per feeding. Comments:     Use feeding plan until follow up with pediatrician. Continue to attempt at the breast for most feeds. Pump every 3 hours if no latch. Give all pumped colostrum/breastmilk at each feeding. OUTPATIENT APPOINTMENT SCHEDULED FOR :    Outpatient services are located on the 4th floor at [facility]. Check in at the 4th floor registration desk (the same one you used when you came to have your baby).   Call for questions [phone]

## 2020-03-25 NOTE — DISCHARGE INSTRUCTIONS
Patient Education        After Your Delivery (the Postpartum Period): Care Instructions  Your Care Instructions    Congratulations on the birth of your baby. Like pregnancy, the  period can be a time of excitement, kimberly, and exhaustion. You may look at your wondrous little baby and feel happy. You may also be overwhelmed by your new sleep hours and new responsibilities. At first, babies often sleep during the days and are awake at night. They do not have a pattern or routine. They may make sudden gasps, jerk themselves awake, or look like they have crossed eyes. These are all normal, and they may even make you smile. In these first weeks after delivery, try to take good care of yourself. It may take 4 to 6 weeks to feel like yourself again, and possibly longer if you had a  birth. You will likely feel very tired for several weeks. Your days will be full of ups and downs, but lots of kimberly as well. Follow-up care is a key part of your treatment and safety. Be sure to make and go to all appointments, and call your doctor if you are having problems. It's also a good idea to know your test results and keep a list of the medicines you take. How can you care for yourself at home? Take care of your body after delivery  · Use pads instead of tampons for the bloody flow that may last as long as 2 weeks. · Ease cramps with ibuprofen (Advil, Motrin). · Ease soreness of hemorrhoids and the area between your vagina and rectum with ice compresses or witch hazel pads. · Ease constipation by drinking lots of fluid and eating high-fiber foods. Ask your doctor about over-the-counter stool softeners. · Cleanse yourself with a gentle squeeze of warm water from a bottle instead of wiping with toilet paper. · Take a sitz bath in warm water several times a day. · Wear a good nursing bra. Ease sore and swollen breasts with warm, wet washcloths.   · If you are not breastfeeding, use ice rather than heat for breast soreness. · Your period may not start for several months if you are breastfeeding. You may bleed more, and longer at first, than you did before you got pregnant. · Wait until you are healed (about 4 to 6 weeks) before you have sexual intercourse. Your doctor will tell you when it is okay to have sex. · Try not to travel with your baby for 5 or 6 weeks. If you take a long car trip, make frequent stops to walk around and stretch. Avoid exhaustion  · Rest every day. Try to nap when your baby naps. · Ask another adult to be with you for a few days after delivery. · Plan for  if you have other children. · Stay flexible so you can eat at odd hours and sleep when you need to. Both you and your baby are making new schedules. · Plan small trips to get out of the house. Change can make you feel less tired. · Ask for help with housework, cooking, and shopping. Remind yourself that your job is to care for your baby. Know about help for postpartum depression  · \"Baby blues\" are common for the first 1 to 2 weeks after birth. You may cry or feel sad or irritable for no reason. · Rest whenever you can. Being tired makes it harder to handle your emotions. · Go for walks with your baby. · Talk to your partner, friends, and family about your feelings. · If your symptoms last for more than a few weeks, or if you feel very depressed, ask your doctor for help. · Postpartum depression can be treated. Support groups and counseling can help. Sometimes medicine can also help. Stay healthy  · Eat healthy foods so you have more energy and lose extra baby pounds. · If you breastfeed, avoid drugs. If you quit smoking during pregnancy, try to stay smoke-free. If you choose to have a drink now and then, have only one drink, and limit the number of occasions that you have a drink. Wait to breastfeed at least 2 hours after you have a drink to reduce the amount of alcohol the baby may get in the milk.   · Start daily exercise after 4 to 6 weeks, but rest when you feel tired. · Learn exercises to tone your belly. Do Kegel exercises to regain strength in your pelvic muscles. You can do these exercises while you stand or sit. ? Squeeze the same muscles you would use to stop your urine. Your belly and thighs should not move. ? Hold the squeeze for 3 seconds, and then relax for 3 seconds. ? Start with 3 seconds. Then add 1 second each week until you are able to squeeze for 10 seconds. ? Repeat the exercise 10 to 15 times for each session. Do three or more sessions each day. · Find a class for new mothers and new babies that has an exercise time. · If you had a  birth, give yourself a bit more time before you exercise, and be careful. When should you call for help? Call  911 anytime you think you may need emergency care. For example, call if:    · You have thoughts of harming yourself, your baby, or another person.     · You passed out (lost consciousness).     · You have chest pain, are short of breath, or cough up blood.     · You have a seizure.    Call your doctor now or seek immediate medical care if:    · You have severe vaginal bleeding. This means you are passing blood clots and soaking through a pad each hour for 2 or more hours.     · You are dizzy or lightheaded, or you feel like you may faint.     · You have a fever.     · You have new or more belly pain.     · You have signs of a blood clot in your leg (called a deep vein thrombosis), such as:  ? Pain in the calf, back of the knee, thigh, or groin. ? Redness and swelling in your leg or groin.     · You have signs of preeclampsia, such as:  ? Sudden swelling of your face, hands, or feet. ? New vision problems (such as dimness, blurring, or seeing spots).   ? A severe headache.    Watch closely for changes in your health, and be sure to contact your doctor if:    · Your vaginal bleeding seems to be getting heavier.     · You have new or worse vaginal discharge.     · You feel sad, anxious, or hopeless for more than a few days.     · You do not get better as expected. Where can you learn more? Go to http://mari-jon.info/  Enter A461 in the search box to learn more about \"After Your Delivery (the Postpartum Period): Care Instructions. \"  Current as of: May 29, 2019Content Version: 12.4  © 0443-1310 Healthwise, Incorporated. Care instructions adapted under license by [employer] (which disclaims liability or warranty for this information). If you have questions about a medical condition or this instruction, always ask your healthcare professional. [address] any warranty or liability for your use of this information. DISCHARGE SUMMARY from Nurse        The discharge information has been reviewed with the patient. The patient verbalized understanding. Discharge medications reviewed with the patient and appropriate educational materials and side effects teaching were provided.   ___________________________________________________________________________________________________________________________________

## 2020-03-25 NOTE — PROGRESS NOTES
Spoke with Dr. Rosalinda Quiros about discharge, patient not being discharged today due to social issues, please see  note, may modify discharge for tomorrow, read back

## 2020-03-25 NOTE — PROGRESS NOTES
1000:  BETITO follow-up with patient. Patient has still not finalized plans for where she and baby will be doing after discharge. Per patient, \"My dad doesn't really want me going back to his (FOB) house, so he might get us a hotel. \"  Additionally, patient now reports that she has a sister in the [place] area that she may be able to stay with. Patient agreeable for BETITO to make referral for [facility] (although this program is now contacting patient telephonically as opposed to in-person due to social distancing). Bag from [facility] provided. 1345:  Upon entering patient's room, patient was crying and states that she and baby \"don't have anywhere to go. \"  She reports that she \"doesn't have the funds\" to pay for a hotel, and no one in her family that lives in [place] will allow her to stay with them. Additionally, all of her personal items and baby's items (car seat, clothing, etc.) are at the home of the baby's father, and the baby's father's mother is refusing to give them to her. Phone calls placed to [facility], [address] - none of these shelters are accepting people at this time due to [address] held with patient who states that if she were able to get back to [place], she could stay with her mother at: [address] [facility]. Patient is willing to leave all of her personal belongings and baby's belongings in [place], if needed. Patient has approximately $50 right now. BETITO provided patient with clothing for herself as well as car seat (has already been provided  items). BETITO investigated cost of bus tickets for patient/baby (requires 2 tickets as patient has a car seat): $343.33. Conversation with Pediatrician who is agreeable for patient/baby to return to [place] via bus as this is the best long-term solution for both patient/baby safety and wellbeing.   The next available bus leaves [place] on the  at 1:10pm and arrives in [place] on the 27th at 4:40 pm (27 hours, 30 minute trip time). SW will follow-up with patient in AM.  Patient/baby may require assistance with additional formula, diapers, food prior to discharge.     ERIK Greer  [facility]   [phone]

## 2020-03-25 NOTE — DISCHARGE SUMMARY
Date of Admission:  3/22/2020  5:57 PM  Date of Discharge:   3/25/2020    Patient Active Problem List   Diagnosis Code    Vaginal bleeding in pregnancy, third trimester O46.93    Anemia D64.9    Anxiety F41.9    Asthma J45.909    Eczema L30.9    Prior exam suggested fetal anomaly, antepartum O35. 9XX0    Spontaneous  O03.9    Supervision of normal first pregnancy in third trimester Z34.03    Amniotic fluid leaking O42.90    Normal labor O80, Z37.9       Aneha Torres 22 y.o.  presented at 40w1d in active labor. Pt had  without incident. See delivery note for all delivery details. Pt's PP course was uneventful. On day of D/C, she was ambulating well, afebrile, with lochia < menses. She was discharged home with medications as below. Pt was breast feeding on discharge. Routine PP instructions given to patient. She is to follow up with [facility] in 6 weeks for PP exam.    Current Discharge Medication List      START taking these medications    Details   ibuprofen (MOTRIN) 400 mg tablet Take 2 Tabs by mouth every eight (8) hours as needed for Pain. Qty: 30 Tab, Refills: 0         CONTINUE these medications which have NOT CHANGED    Details   ferrous sulfate 324 mg (65 mg iron) tablet Take 324 mg by mouth.      prenatal vit-calcium-iron-fa (PRENATAL PLUS with CALCIUM) 27 mg iron- 1 mg tab Take 1 Tab by mouth daily. albuterol (PROVENTIL HFA, VENTOLIN HFA, PROAIR HFA) 90 mcg/actuation inhaler Take 2 Puffs by inhalation.              Radha Records, DO  2:38 PM  20

## 2020-03-25 NOTE — LACTATION NOTE
Back to check in on mom. 3 hrs since baby last fed. Got baby skin to skin w/ mom to try. Attempted on R breast in football hold. Baby spitty and had to tip over and use bulb syringe to get out mucous in mouth. Then baby had very large stool. Cleaned baby back up and tried at breast again. Baby uninterested. Wrapped baby back up to rest for 1/2 hr and instructed mom to try baby again at breast. Can call out as needed. If baby does not latch and take full feed then, encouraged to use hospital grade pump for 15 minutes, both breasts, and give back milk.

## 2020-03-25 NOTE — LACTATION NOTE
In to see mom and infant for discharge. Infant asleep in bassinet. Mom states baby starting to take some longer feeds recently compared to yesterday. Mom going home today, but still unsure where to. Gave mom printed copy of feeding plan and reviewed how to use for guidance. Highly encouraged her to pump behind any short,fair feeds and offer back expressed breast milk. Mom verbalized understanding. Mom does not have pump at home, hesitant to rent as not sure where she is going go. Gave mom a manual pump to use as needed and full instruction how to use. Discussed discharge info and how to manage period of engorgement. Will come back at next feed to assist mom w/ breast feeding as needed.

## 2020-03-26 VITALS
HEIGHT: 64 IN | HEART RATE: 62 BPM | SYSTOLIC BLOOD PRESSURE: 126 MMHG | RESPIRATION RATE: 16 BRPM | TEMPERATURE: 97.8 F | OXYGEN SATURATION: 98 % | BODY MASS INDEX: 30.73 KG/M2 | DIASTOLIC BLOOD PRESSURE: 77 MMHG | WEIGHT: 180 LBS

## 2020-03-26 PROCEDURE — 74011250637 HC RX REV CODE- 250/637: Performed by: OBSTETRICS & GYNECOLOGY

## 2020-03-26 RX ADMIN — FERROUS SULFATE TAB 325 MG (65 MG ELEMENTAL FE) 325 MG: 325 (65 FE) TAB at 10:11

## 2020-03-26 RX ADMIN — DOCUSATE SODIUM 100 MG: 100 CAPSULE, LIQUID FILLED ORAL at 10:11

## 2020-03-26 RX ADMIN — IBUPROFEN 400 MG: 400 TABLET, FILM COATED ORAL at 10:11

## 2020-03-26 NOTE — PROGRESS NOTES
BETITO worked with RN to confirm that the patient has an ID. BETITO notified  supervisor who will order a bus ticket for the patient to return to [place]. BETITO will arrange for a cab at noon to take the patient to the [employer] station off [address]. Patient provided with snacks, sandwich trays, and necessary items for baby. Patient medically discharged and appropriate for bus travel.       Lesia Kirkland-Lens    [address] [email or web address]

## 2020-03-26 NOTE — PROGRESS NOTES
Pt ready for scheduled discharge to home. Code Alert removed. Escorted off unit by [facility] staff with infant in rear facing car seat to awaiting cab arranged by . Cab to take pt to [place] bus station for awaiting bus arranged by social work.

## 2020-03-26 NOTE — PROGRESS NOTES
Discharge teaching complete. Pt verbalized understanding, questions encouraged. .  Pt. Stable and discharged to home per MD order.

## 2020-03-27 NOTE — PROGRESS NOTES
Phone call to patient at [phone] to check-in. No answer; message left.     [facility]  [facility]   [phone]